# Patient Record
Sex: FEMALE | Race: WHITE | NOT HISPANIC OR LATINO | URBAN - NONMETROPOLITAN AREA
[De-identification: names, ages, dates, MRNs, and addresses within clinical notes are randomized per-mention and may not be internally consistent; named-entity substitution may affect disease eponyms.]

---

## 2019-04-29 ENCOUNTER — IMPORTED ENCOUNTER (OUTPATIENT)
Dept: URBAN - NONMETROPOLITAN AREA CLINIC 1 | Facility: CLINIC | Age: 49
End: 2019-04-29

## 2019-04-29 PROCEDURE — 92014 COMPRE OPH EXAM EST PT 1/>: CPT

## 2019-04-29 PROCEDURE — 92310 CONTACT LENS FITTING OU: CPT

## 2019-04-29 PROCEDURE — 92015 DETERMINE REFRACTIVE STATE: CPT

## 2019-04-29 NOTE — PATIENT DISCUSSION
Myopia OU-Discussed diagnosis with patient. -Explained that people who are myopic are at a higher risk for developing RD/RT and reviewed associated S&S.-Pt to contact our office if symptoms develop. Astigmatism OS-Discussed diagnosis with patient. Updated spec Rx given. Recommend lens that will provide comfort as well as protect safety and health of eyes. ***Recommend trial of no ctl OS to see if [facility] accomodates for near monovison.  instructed patient to call Bari Mercado prior to despensing CTL Rx.

## 2019-04-30 PROBLEM — H52.4: Noted: 2019-04-30

## 2019-04-30 PROBLEM — H52.13: Noted: 2019-04-30

## 2019-04-30 PROBLEM — H52.223: Noted: 2019-04-30

## 2019-05-06 ENCOUNTER — IMPORTED ENCOUNTER (OUTPATIENT)
Dept: URBAN - NONMETROPOLITAN AREA CLINIC 1 | Facility: CLINIC | Age: 49
End: 2019-05-06

## 2019-05-06 NOTE — PATIENT DISCUSSION
Myopia OU-Discussed diagnosis with patient. -Explained that people who are myopic are at a higher risk for developing RD/RT and reviewed associated S&S.-Pt to contact our office if symptoms develop. Astigmatism OS-Discussed diagnosis with patient. Updated spec Rx given. Recommend lens that will provide comfort as well as protect safety and health of eyes. ***Recommend trial of no ctl OS to see if [facility] accomodates for near monovison.  instructed patient to call Lise Warner prior to despensing CTL Rx.

## 2019-05-13 ENCOUNTER — IMPORTED ENCOUNTER (OUTPATIENT)
Dept: URBAN - NONMETROPOLITAN AREA CLINIC 1 | Facility: CLINIC | Age: 49
End: 2019-05-13

## 2019-05-13 NOTE — PATIENT DISCUSSION
Myopia OU-Discussed diagnosis with patient. -Explained that people who are myopic are at a higher risk for developing RD/RT and reviewed associated S&S.-Pt to contact our office if symptoms develop. Astigmatism OS-Discussed diagnosis with patient. Updated spec Rx given. Recommend lens that will provide comfort as well as protect safety and health of eyes. ***Recommend trial of no ctl OS to see if [facility] accomodates for near monovison.  instructed patient to call Cleo Bustillo prior to despensing CTL Rx.

## 2019-05-15 ENCOUNTER — IMPORTED ENCOUNTER (OUTPATIENT)
Dept: URBAN - NONMETROPOLITAN AREA CLINIC 1 | Facility: CLINIC | Age: 49
End: 2019-05-15

## 2019-05-15 NOTE — PATIENT DISCUSSION
Myopia OU-Discussed diagnosis with patient. -Explained that people who are myopic are at a higher risk for developing RD/RT and reviewed associated S&S.-Pt to contact our office if symptoms develop. Astigmatism OS-Discussed diagnosis with patient. Updated spec Rx given. Recommend lens that will provide comfort as well as protect safety and health of eyes. ***Recommend trial of no ctl OS to see if [facility] accomodates for near monovison.  instructed patient to call Tamika Schuster prior to despensing CTL Rx.

## 2020-08-26 ENCOUNTER — IMPORTED ENCOUNTER (OUTPATIENT)
Dept: URBAN - NONMETROPOLITAN AREA CLINIC 1 | Facility: CLINIC | Age: 50
End: 2020-08-26

## 2020-08-26 PROCEDURE — 92014 COMPRE OPH EXAM EST PT 1/>: CPT

## 2020-08-26 PROCEDURE — 92015 DETERMINE REFRACTIVE STATE: CPT

## 2020-08-26 PROCEDURE — 92310 CONTACT LENS FITTING OU: CPT

## 2020-08-26 NOTE — PATIENT DISCUSSION
Myopia OU-Discussed diagnosis with patient. -Explained that people who are myopic are at a higher risk for developing RD/RT and reviewed associated S&S.-Pt to contact our office if symptoms develop. Astigmatism OS-Discussed diagnosis with patient. Updated spec Rx given. Recommend lens that will provide comfort as well as protect safety and health of eyes. ***Trial lens ordered

## 2022-04-09 ASSESSMENT — VISUAL ACUITY
OS_SC: J5
OS_SC: J5
OD_SC: 20/20-
OD_SC: J16
OD_CC: J16
OS_SC: 20/40
OS_SC: J5
OD_SC: J16
OS_SC: 20/25-2
OS_PH: 20/25
OD_SC: J16
OD_SC: 20/20-
OS_SC: J5
OS_SC: J5
OD_SC: J16
OD_SC: J16
OD_SC: 20/20-
OS_SC: 20/40
OD_SC: 20/20-
OS_CC: J5
OS_SC: 20/40
OD_SC: 20/20-

## 2022-04-09 ASSESSMENT — TONOMETRY
OD_IOP_MMHG: 14
OS_IOP_MMHG: 14
OD_IOP_MMHG: 14
OS_IOP_MMHG: 14